# Patient Record
Sex: MALE | Race: WHITE | Employment: FULL TIME | URBAN - METROPOLITAN AREA
[De-identification: names, ages, dates, MRNs, and addresses within clinical notes are randomized per-mention and may not be internally consistent; named-entity substitution may affect disease eponyms.]

---

## 2020-01-19 ENCOUNTER — HOSPITAL ENCOUNTER (EMERGENCY)
Age: 50
Discharge: HOME | End: 2020-01-19
Attending: EMERGENCY MEDICINE
Payer: COMMERCIAL

## 2020-01-19 VITALS
BODY MASS INDEX: 32.78 KG/M2 | OXYGEN SATURATION: 100 % | SYSTOLIC BLOOD PRESSURE: 155 MMHG | HEIGHT: 63 IN | RESPIRATION RATE: 16 BRPM | WEIGHT: 185 LBS | DIASTOLIC BLOOD PRESSURE: 87 MMHG | HEART RATE: 105 BPM | TEMPERATURE: 99.5 F

## 2020-01-19 DIAGNOSIS — M79.18 BUTTOCK PAIN: Primary | ICD-10-CM

## 2020-01-19 PROCEDURE — 99284 EMERGENCY DEPT VISIT MOD MDM: CPT

## 2020-01-19 RX ORDER — TRAMADOL HYDROCHLORIDE 50 MG/1
50 TABLET ORAL
COMMUNITY
End: 2020-08-26

## 2020-01-19 RX ORDER — HYDROCHLOROTHIAZIDE 12.5 MG/1
12.5 TABLET ORAL DAILY
COMMUNITY
End: 2020-08-26

## 2020-01-19 RX ORDER — CEPHALEXIN 500 MG/1
500 CAPSULE ORAL 4 TIMES DAILY
Qty: 28 CAP | Refills: 0 | Status: SHIPPED | OUTPATIENT
Start: 2020-01-19 | End: 2020-01-26

## 2020-01-19 NOTE — ED NOTES
Patient states he is here today with c/o mid buttocks pain that he states started yesterday. He states he has a history of yunior-abscess' and that the last 2 times he had them he had to have surgery to have them removed and was hospitalized for one because his white count was greater than [id number]. The patient denies any other complaint at this time. Emergency Department Nursing Plan of Care       The Nursing Plan of Care is developed from the Nursing assessment and Emergency Department Attending provider initial evaluation. The plan of care may be reviewed in the ED Provider note.     The Plan of Care was developed with the following considerations:   Patient / Family readiness to learn indicated by:verbalized understanding  Persons(s) to be included in education: patient  Barriers to Learning/Limitations:No    [address], RN    1/19/2020   8:53 AM

## 2020-01-19 NOTE — ED PROVIDER NOTES
EMERGENCY DEPARTMENT HISTORY AND PHYSICAL EXAM      Date: 1/19/2020  Patient Name: Mita Hardin    History of Presenting Illness     Chief Complaint   Patient presents with    Buttocks pain     mid buttocks, pt is not sure if it is an abscess or not but has had them in the past in this area     History Provided By: Patient    HPI: Mtia Hardin, 52 y.o. male with past medical history significant for asthma, hypertension, neurofibromatosis, and pilonidal abscesses who presents via private vehicle to the ED with cc of upper buttocks pain that started yesterday. Patient states he has a history of pilonidal abscesses and this is the way the usually present. He denies any firmness or swelling to the area. His pain is worse with sitting and nothing makes the pain better. He denies any fevers, chills, vomiting, or diarrhea. PMHx: Asthma, hypertension, neurofibromatosis, pilonidal abscess  Social Hx: Denies alcohol, tobacco, or illegal drug use    PCP: None    There are no other complaints, changes, or physical findings at this time. No current facility-administered medications on file prior to encounter. Current Outpatient Medications on File Prior to Encounter   Medication Sig Dispense Refill    hydroCHLOROthiazide (HYDRODIURIL) 12.5 mg tablet Take 12.5 mg by mouth daily.  traMADol (ULTRAM) 50 mg tablet Take 50 mg by mouth every six (6) hours as needed for Pain. Past History     Past Medical History:  Past Medical History:   Diagnosis Date    Asthma     Hypertension     Ill-defined condition     neurofibromatosis     Past Surgical History:  Past Surgical History:   Procedure Laterality Date    HX HEENT      surgery for a broken nose     Family History:  History reviewed. No pertinent family history.   Social History:  Social History     Tobacco Use    Smoking status: Never Smoker    Smokeless tobacco: Never Used   Substance Use Topics    Alcohol use: Never     Frequency: Never    Drug use: Never     Allergies:  No Known Allergies  Review of Systems   Review of Systems   Constitutional: Negative for chills and fever. HENT: Negative for congestion, rhinorrhea, sneezing and sore throat. Eyes: Negative for redness and visual disturbance. Respiratory: Negative for shortness of breath. Cardiovascular: Negative for chest pain and leg swelling. Gastrointestinal: Negative for abdominal pain, anal bleeding, constipation, nausea and vomiting. Genitourinary: Negative for difficulty urinating and frequency. Musculoskeletal: Negative for back pain, myalgias and neck stiffness. Skin: Negative for rash. Pain between the buttocks   Neurological: Negative for dizziness, syncope, weakness and headaches. Hematological: Negative for adenopathy. All other systems reviewed and are negative. Physical Exam   Physical Exam  Vitals signs and nursing note reviewed. Constitutional:       Appearance: Normal appearance. He is well-developed. HENT:      Head: Normocephalic and atraumatic. Neck:      Musculoskeletal: Full passive range of motion without pain, normal range of motion and neck supple. Cardiovascular:      Rate and Rhythm: Regular rhythm. Tachycardia present. Pulses: Normal pulses. Heart sounds: Normal heart sounds. No murmur. Pulmonary:      Effort: Pulmonary effort is normal. No respiratory distress. Breath sounds: Normal breath sounds. Chest:      Chest wall: No tenderness. Abdominal:      General: Bowel sounds are normal.      Palpations: Abdomen is soft. Tenderness: There is no tenderness. There is no guarding or rebound. Musculoskeletal:        Back:    Skin:     General: Skin is warm and dry. Findings: No erythema or rash. Neurological:      Mental Status: He is alert and oriented to person, place, and time. Psychiatric:         Speech: Speech normal.         Behavior: Behavior normal.         Thought Content:  Thought content normal. Judgment: Judgment normal.       Diagnostic Study Results   Labs -   No results found for this or any previous visit (from the past 12 hour(s)). Radiologic Studies -   No orders to display     No results found. Medical Decision Making   I am the first provider for this patient. I reviewed the vital signs, available nursing notes, past medical history, past surgical history, family history and social history. Vital Signs-Reviewed the patient's vital signs. Patient Vitals for the past 12 hrs:   Temp Pulse Resp BP SpO2   01/19/20 0811 99.5 °F (37.5 °C) (!) 112 16 (!) 158/94 100 %     Pulse Oximetry Analysis - 100% on RA    Records Reviewed: Nursing Notes    Provider Notes (Medical Decision Making):   49-year-old male presents with pain in the pilonidal region. I do not appreciate any abscess, fluid collection, or induration on physical exam.  Discussed with patient that there is no indication for I&D at this time. Per current literature review, there may be some recommendations to start on antibiotics if this is an early infection. Will start patient on Keflex and have patient follow-up with outpatient colorectal surgery and/or general surgery. If symptoms worsen or a fluid collection does appear, will have patient return to the emergency department for reevaluation. ED Course:   Initial assessment performed. The patients presenting problems have been discussed, and they are in agreement with the care plan formulated and outlined with them. I have encouraged them to ask questions as they arise throughout their visit. Progress Note:   Updated pt on all returned results and findings. Discussed the importance of proper follow up as referred below along with return precautions. Pt in agreement with the care plan and expresses agreement with and understanding of all items discussed. Disposition:  Discharge Note:  The pt is ready for discharge.  The pt's signs, symptoms, diagnosis, and discharge instructions have been discussed and pt has conveyed their understanding. The pt is to follow up as recommended or return to ER should their symptoms worsen. Plan has been discussed and pt is in agreement. PLAN:  1. Current Discharge Medication List      START taking these medications    Details   cephALEXin (KEFLEX) 500 mg capsule Take 1 Cap by mouth four (4) times daily for 7 days. Qty: 28 Cap, Refills: 0           2. Follow-up Information     Follow up With Specialties Details Why Contact Info    [facility]  Schedule an appointment as soon as possible for a visit  [address] [facility]    Alfa Hunter MD General Surgery, Breast Surgery, Oncology Schedule an appointment as soon as possible for a visit  [address]  [address] [facility],[address]      [facility] [address] [place] [address] DEPT Emergency Medicine  As needed, If symptoms worsen [facility]  [phone]        Return to ED if worse     Diagnosis     Clinical Impression:   1. Buttock pain            Please note that this dictation was completed with Dragon, computer voice recognition software. Quite often unanticipated grammatical, syntax, homophones, and other interpretive errors are inadvertently transcribed by the computer software. Please disregard these errors. Additionally, please excuse any errors that have escaped final proofreading.

## 2020-01-19 NOTE — DISCHARGE INSTRUCTIONS
Patient Education        Learning About Pilonidal Disease  What is pilonidal disease? Pilonidal (say \"hs-bbl-RK-dul\") disease is a long-term skin infection. The infection develops in a cyst at the top of or next to the crease between the buttocks. The cyst may look like a small dimple, which is called a \"pit\" or \"sinus. \" Hair may grow from the pit, and you may have several pits. What are the symptoms? · You may have no symptoms. · If the cyst is infected, you may:  ? Have redness or swelling in the area. ? Have cloudy fluid or blood draining from the cyst.  ? Find it hard to walk or sit because of pain. ? Have a fever. This is not common. How can you prevent infection? You may be able to prevent infection and symptoms. · Keep the area clean and dry. · Avoid sitting on hard surfaces for long periods of time. How is pilonidal disease treated? If you have a pilonidal cyst that is not causing symptoms, you don't need medical treatment. If a pilonidal cyst is infected:  · You will probably get antibiotics. If your doctor prescribes antibiotics, take them as directed. Do not stop taking them just because you feel better. You need to take the full course of antibiotics. · Soak in a warm tub several times a day. · Ask your doctor if you can take an over-the-counter pain medicine, such as acetaminophen (Tylenol), ibuprofen (Advil, Motrin), or naproxen (Aleve). Read and follow all instructions on the label. · You may need to have the cyst cut open and drained or removed. Follow-up care is a key part of your treatment and safety. Be sure to make and go to all appointments, and call your doctor if you are having problems. It's also a good idea to know your test results and keep a list of the medicines you take. Where can you learn more? Go to http://gadiel-mick.info/. Enter C612 in the search box to learn more about \"Learning About Pilonidal Disease. \"  Current as of: April 1, 2019  Content Version: 12.2  © 0848-7425 [employer]. Care instructions adapted under license by [employer] (which disclaims liability or warranty for this information). If you have questions about a medical condition or this instruction, always ask your healthcare professional. [address] any warranty or liability for your use of this information.

## 2020-02-07 ENCOUNTER — OFFICE VISIT (OUTPATIENT)
Dept: SURGERY | Age: 50
End: 2020-02-07

## 2020-02-07 ENCOUNTER — TELEPHONE (OUTPATIENT)
Dept: SURGERY | Age: 50
End: 2020-02-07

## 2020-02-07 VITALS
DIASTOLIC BLOOD PRESSURE: 82 MMHG | SYSTOLIC BLOOD PRESSURE: 149 MMHG | HEART RATE: 122 BPM | BODY MASS INDEX: 31.71 KG/M2 | RESPIRATION RATE: 20 BRPM | TEMPERATURE: 99.8 F | OXYGEN SATURATION: 99 % | HEIGHT: 63 IN | WEIGHT: 179 LBS

## 2020-02-07 DIAGNOSIS — L02.91 ABSCESS: Primary | ICD-10-CM

## 2020-02-07 RX ORDER — AMLODIPINE BESYLATE 10 MG/1
TABLET ORAL
COMMUNITY
Start: 2020-01-30 | End: 2020-08-26

## 2020-02-07 RX ORDER — LIDOCAINE HYDROCHLORIDE AND EPINEPHRINE 10; 10 MG/ML; UG/ML
10 INJECTION, SOLUTION INFILTRATION; PERINEURAL ONCE
Qty: 1 VIAL | Refills: 0
Start: 2020-02-07 | End: 2020-02-07

## 2020-02-07 NOTE — PROGRESS NOTES
HISTORY OF PRESENT ILLNESS  Tu Conrad is a 52 y.o. male. Had a pilonidal cyst before  2 surgeries 8 years ago    Pain coming back    3 weeks ago took a round of keflex        ____________________________________________________________________________  Patient presents with:  Cyst: Seen at the request of Dr Dank Mccollum ([facility]) to evaluate possible pilonidal cyst    /82 (BP 1 Location: Right arm, BP Patient Position: Sitting)   Pulse (!) 122   Temp 99.8 °F (37.7 °C)   Resp 20   Ht 5' 3\" (1.6 m)   Wt 81.2 kg (179 lb)   SpO2 99%   BMI 31.71 kg/m²   Past Medical History:  No date: Asthma  No date: Hypertension  No date: Ill-defined condition      Comment:  neurofibromatosis  Past Surgical History:  No date: HX HEENT      Comment:  surgery for a broken nose  Social History    Socioeconomic History      Marital status: UNKNOWN      Spouse name: Not on file      Number of children: Not on file      Years of education: Not on file      Highest education level: Not on file    Tobacco Use      Smoking status: Never Smoker      Smokeless tobacco: Never Used    Substance and Sexual Activity      Alcohol use: Never        Frequency: Never      Drug use: Never    History reviewed. No pertinent family history. Current Outpatient Medications:  amLODIPine (NORVASC) 10 mg tablet, TAKE 1 TABLET BY MOUTH EVERY DAY  hydroCHLOROthiazide (HYDRODIURIL) 12.5 mg tablet, Take 12.5 mg by mouth daily. traMADol (ULTRAM) 50 mg tablet, Take 50 mg by mouth every six (6) hours as needed for Pain. No current facility-administered medications for this visit. Allergies: No Known Allergies  _____________________________________________________________________________      Cyst   The history is provided by the patient. This is a new problem. The current episode started more than 1 week ago. The problem occurs constantly. The problem has not changed since onset. Pertinent negatives include no chest pain, no abdominal pain, no headaches and no shortness of breath. The symptoms are aggravated by bending and twisting. Nothing relieves the symptoms. The treatment provided no relief. Review of Systems   Constitutional: Negative for chills, fever and weight loss. HENT: Negative for ear pain. Eyes: Negative for pain. Respiratory: Negative for shortness of breath. Cardiovascular: Negative for chest pain. Gastrointestinal: Negative for abdominal pain and blood in stool. Genitourinary: Negative for hematuria. Musculoskeletal: Negative for joint pain. Skin: Negative for rash. Neurological: Negative for dizziness, focal weakness, seizures and headaches. Endo/Heme/Allergies: Does not bruise/bleed easily. Psychiatric/Behavioral: The patient does not have insomnia. Physical Exam  Constitutional:       General: He is not in acute distress. Appearance: He is well-developed. He is not diaphoretic. HENT:      Head: Normocephalic and atraumatic. Mouth/Throat:      Pharynx: No oropharyngeal exudate. Eyes:      Pupils: Pupils are equal, round, and reactive to light. Neck:      Musculoskeletal: Normal range of motion. Trachea: No tracheal deviation. Cardiovascular:      Rate and Rhythm: Normal rate and regular rhythm. Heart sounds: Normal heart sounds. No murmur. Pulmonary:      Effort: Pulmonary effort is normal. No respiratory distress. Breath sounds: Normal breath sounds. No wheezing. Abdominal:      General: Bowel sounds are normal. There is no distension. Palpations: Abdomen is soft. There is no mass. Tenderness: There is no abdominal tenderness. There is no guarding or rebound. Musculoskeletal: Normal range of motion. General: No tenderness. Lymphadenopathy:      Cervical: No cervical adenopathy. Skin:     General: Skin is warm. Findings: No erythema or rash.    Neurological:      Mental Status: He is alert and oriented to person, place, and time.   Psychiatric:         Behavior: Behavior normal.         ASSESSMENT and PLAN    ICD-10-CM ICD-9-CM    1. Abscess L02.91 682.9 AEROBIC BACTERIAL CULTURE      lidocaine-EPINEPHrine (XYLOCAINE) 1 %-1:100,000 injection     I had an extensive discussion with Zoraida Valles regarding the risks, benefits, and alternatives of proceeding with a incision and drainage of this abscess. Risks of surgery including the risk of anesthesia, bleeding, infection, injury to underlying structures, recurrence, need for repeat or more extensive procedures, and the lack of symptomatic improvement were discussed and he is in agreement to proceed. Rx management:  Orders Placed This Encounter    AEROBIC BACTERIAL CULTURE     Aerobic culture of I&D pilonidal cyst    amLODIPine (NORVASC) 10 mg tablet     Sig: TAKE 1 TABLET BY MOUTH EVERY DAY    lidocaine-EPINEPHrine (XYLOCAINE) 1 %-1:100,000 injection     Sig: 10 mL by IntraDERMal route once for 1 dose. Indications: administration of local anesthetic drug, Lot #-[id number]  Exp:  [id number]  20mL btl - only 10 mL used     Dispense:  1 Vial     Refill:  0    trimethoprim-sulfamethoxazole (BACTRIM DS, SEPTRA DS) 160-800 mg per tablet     Sig: Take 1 Tab by mouth two (2) times a day for 10 days. Dispense:  20 Tab     Refill:  0       He will complete his course of antibiotics. Wound care instructions were reviewed and provided in writing. Thank you for this consult. Procedure: Incision and drainage of complex coccygeal abscess       Procedure Details: The risks, benefits, and alternatives were explained and consent was obtained for the procedure. The area was sterile prepped and draped in the usual manner. 1% lidocaine with epinephrine was infiltrated into the skin overlying the abscess. An incision was made. A Very large amount of pus was obtained. A culture was obtained. The loculations and crypts within the wound were broken up with a hemostat.   The wound was packed with 1/2\" iodoform gauze. A sterile dressing was then applied. The patient tolerated the procedure well. Wound care instructions were given.

## 2020-02-07 NOTE — PATIENT INSTRUCTIONS
**Wound Care:    · Replace outer gauze with new dry gauze twice a day starting today. · Starting Tomorrow, start pulling out the packing 2\" a day:  · Pull the packing 2\" and trim the excess, leave a long tail so you don't loose the packing in the wound.   · Continue to change the outer gauze twice a day  · Pull the packing daily until the packing is completely out

## 2020-02-07 NOTE — PROGRESS NOTES
[facility]  OFFICE PROCEDURE PROGRESS NOTE        Chart reviewed for the following:   Jesus LUGO, have reviewed the History, Physical and updated the Allergic reactions for [address] performed immediately prior to start of procedure:   Jesus LUGO, have performed the following reviews on Wayne Younger prior to the start of the procedure:            * Patient was identified by name and date of birth   * Agreement on procedure being performed was verified  * Risks and Benefits explained to the patient  * Procedure site verified and marked as necessary  * Patient was positioned for comfort  * Consent was signed and verified     Time: 2:25 pm      Date of procedure: 2/7/2020    Procedure performed by:  Amparo Rainey MD    Provider assist: none    Patient assisted by: self    How tolerated by patient: tolerated the procedure well with no complications    Post Procedural Pain Scale: 0 - No Hurt    Comments: none

## 2020-02-07 NOTE — TELEPHONE ENCOUNTER
Spoke with home health nurse. Mr Yoanna Ngo needs to have a confirmed caregiver in order for nurse to have someone that can change dressing on a daily basis. Pt will call our office if he needs to be seen sooner than next appt.

## 2020-02-08 RX ORDER — SULFAMETHOXAZOLE AND TRIMETHOPRIM 800; 160 MG/1; MG/1
1 TABLET ORAL 2 TIMES DAILY
Qty: 20 TAB | Refills: 0 | Status: SHIPPED | OUTPATIENT
Start: 2020-02-08 | End: 2020-03-05 | Stop reason: SDUPTHER

## 2020-02-10 LAB
BACTERIA SPEC AEROBE CULT: ABNORMAL
BACTERIA SPEC AEROBE CULT: ABNORMAL

## 2020-02-20 ENCOUNTER — OFFICE VISIT (OUTPATIENT)
Dept: SURGERY | Age: 50
End: 2020-02-20

## 2020-02-20 VITALS
SYSTOLIC BLOOD PRESSURE: 150 MMHG | WEIGHT: 185 LBS | BODY MASS INDEX: 32.78 KG/M2 | TEMPERATURE: 98.3 F | HEART RATE: 74 BPM | HEIGHT: 63 IN | OXYGEN SATURATION: 99 % | DIASTOLIC BLOOD PRESSURE: 81 MMHG

## 2020-02-20 DIAGNOSIS — L02.91 ABSCESS: Primary | ICD-10-CM

## 2020-02-20 NOTE — PROGRESS NOTES
Chief Complaint   Patient presents with    Follow-up     po I&D OF COMPLEX COCCYGEAL ABSCESS 02/07/2020       Took about 10 days to get the packing out. He is feeling much better denies any pain. Denies any recent drainage. On exam there is still a 1 cm opening definitely looks much better no more fluctuance. Today I do not see any sinus tracts so there is a good chance that this is not a recurrent pilonidal cyst.    We reviewed wound care have asked to keep it covered with some dry gauze until it is completely healed he will follow-up in about in about 1 month. I can reassess it then if it looks completely healed and not sure he is getting much more. He expressed understanding of our discussion and agreeable with the plan. He will follow-up sooner with any recurrent symptoms. I had an extensive and thorough discussion with Wayne Younger regarding current diagnosis and treatment recommendations. Total face-to-face time spent with him was 15 minutes with the majority spent with counseling and coordination of care.

## 2020-02-20 NOTE — PROGRESS NOTES
Chief Complaint   Patient presents with    Follow-up     po I&D OF COMPLEX COCCYGEAL ABSCESS 02/07/2020     1. Have you been to the ER, urgent care clinic since your last visit? Hospitalized since your last visit? No    2. Have you seen or consulted any other health care providers outside of the [address] [place] [address] since your last visit? Include any pap smears or colon screening.  No

## 2020-03-05 ENCOUNTER — OFFICE VISIT (OUTPATIENT)
Dept: SURGERY | Age: 50
End: 2020-03-05

## 2020-03-05 VITALS
BODY MASS INDEX: 32.92 KG/M2 | WEIGHT: 185.8 LBS | RESPIRATION RATE: 18 BRPM | HEIGHT: 63 IN | HEART RATE: 100 BPM | TEMPERATURE: 100 F | OXYGEN SATURATION: 100 % | SYSTOLIC BLOOD PRESSURE: 157 MMHG | DIASTOLIC BLOOD PRESSURE: 83 MMHG

## 2020-03-05 DIAGNOSIS — K61.1 PERIRECTAL ABSCESS: Primary | ICD-10-CM

## 2020-03-05 RX ORDER — SULFAMETHOXAZOLE AND TRIMETHOPRIM 800; 160 MG/1; MG/1
1 TABLET ORAL 2 TIMES DAILY
Qty: 14 TAB | Refills: 0 | Status: SHIPPED | OUTPATIENT
Start: 2020-03-05 | End: 2020-03-12

## 2020-03-05 NOTE — PROGRESS NOTES
Chief Complaint   Patient presents with    Post-Op Problem     S/P I&D of coccygeal abscess on 2/20/20. Surgical sight bothering pt. 1. Have you been to the ER, urgent care clinic since your last visit? Hospitalized since your last visit? No    2. Have you seen or consulted any other health care providers outside of the [address] [place] [address] since your last visit? Include any pap smears or colon screening.   [facility] 2/2020

## 2020-03-05 NOTE — PROGRESS NOTES
Chief Complaint   Patient presents with    Post-Op Problem     S/P I&D of coccygeal abscess on 2/20/20. Surgical sight bothering pt. Pain got better after the I&D    Pain came over the weekend    Exam: He has a recurrent 3 mm opening on the right that seems to track down towards the perirectal area. There are no midline sinus tracts making a pilonidal cyst less likely. The drainage was sent for culture. I reviewed a differential diagnosis with Mr. Hermilo Stewart he now is had a second recurrence of an abscess in this area. He is previously had a pilonidal cyst excised however I currently do not see any sinus tracts that would make me believe that this is a recurrent pilonidal cyst.  He may have a chronic abscess cavity and may be a perirectal fistula. I suggested we do an MRI to better evaluate the area and then after that he will likely need exam under anesthesia and excision of this chronic abscess possible fistulotomy. I had an extensive discussion with Mignon Martin regarding the risks, benefits, and alternatives of proceeding with surgery. Risks of surgery including the risk of anesthesia, bleeding, infection, injury to underlying structures, recurrence, incontinence, need for repeat or more extensive procedures, and the lack of symptomatic improvement were discussed and he is in agreement to proceed. Rx management:  Orders Placed This Encounter    MRI PELV W CONT     Standing Status:   Future     Standing Expiration Date:   9/5/2021     Order Specific Question:   STAT Creatinine as indicated     Answer:   Yes     Order Specific Question:   Reason for Exam     Answer:   recurrent perirectal abscess, evaluate for fistula    trimethoprim-sulfamethoxazole (BACTRIM DS, SEPTRA DS) 160-800 mg per tablet     Sig: Take 1 Tab by mouth two (2) times a day for 7 days. Dispense:  14 Tab     Refill:  0       Further management after the MRI.     Expressed understanding of our discussion and agreeable with the plan. I had an extensive and thorough discussion with Orestes Soliman regarding current diagnosis and treatment recommendations. Total face-to-face time spent with him was 25 minutes with the majority spent with counseling and coordination of care.

## 2020-03-09 LAB
BACTERIA SPEC AEROBE CULT: ABNORMAL
BACTERIA SPEC AEROBE CULT: ABNORMAL

## 2020-03-19 ENCOUNTER — HOSPITAL ENCOUNTER (OUTPATIENT)
Dept: MRI IMAGING | Age: 50
Discharge: HOME | End: 2020-03-19
Attending: SURGERY
Payer: COMMERCIAL

## 2020-03-19 DIAGNOSIS — K61.1 PERIRECTAL ABSCESS: ICD-10-CM

## 2020-03-19 PROCEDURE — 72197 MRI PELVIS W/O & W/DYE: CPT

## 2020-03-19 PROCEDURE — A9575 INJ GADOTERATE MEGLUMI 0.1ML: HCPCS | Performed by: SURGERY

## 2020-03-19 PROCEDURE — 74011250636 HC RX REV CODE- 250/636: Performed by: SURGERY

## 2020-03-19 RX ORDER — GADOTERATE MEGLUMINE 376.9 MG/ML
15 INJECTION INTRAVENOUS
Status: COMPLETED | OUTPATIENT
Start: 2020-03-19 | End: 2020-03-19

## 2020-03-19 RX ADMIN — GADOTERATE MEGLUMINE 15 ML: 376.9 INJECTION INTRAVENOUS at 16:19

## 2020-03-24 ENCOUNTER — TELEPHONE (OUTPATIENT)
Dept: SURGERY | Age: 50
End: 2020-03-24

## 2020-03-24 DIAGNOSIS — R93.5 ABNORMAL MRI, PELVIS: Primary | ICD-10-CM

## 2020-03-24 NOTE — TELEPHONE ENCOUNTER
Pt agreeable with appointment with Dr. Prerna Campbell on 4/1/20 @ 10:00am.Tel. # and address given to pt. Notes, imaging,etc faxed to Dr. Kerns Fu office.

## 2020-03-24 NOTE — TELEPHONE ENCOUNTER
Reviewed MRI findings and discussed with Dr. Isaias Burch:  1. Fluid tract penetrating the left distal rectal wall at 1:00-2:00. Associated  crescent of extraluminal or subserosal fluid extending along the left lateral  rectal wall. Anteroinferior extension into the left puborectalis muscle. 2. Second fluid tract penetrating the right mid rectal wall at 10:00. Associated  crescent of extraluminal or subserosal fluid extending along the right lateral  rectal wall. The two crescents of fluid probably communicate posteriorly at  6:00.  3. Eroded coccyx. Trace fluid collection anteroinferior to the residual coccyx. Thick rind of enhancement extending around the coccyx, replacing the levator  plate, and extending into the puborectalis and iliococcygeal muscles  posteriorly. This may communicate with the rectal wall fluid. 4. Multiple, small, indeterminate, enhancing nodules along the mesorectal  fascia. No overt rectal mass. Nearly circumferential, low level T2  hyperintensity without bulky, masslike component. Reactive wall change is  favored over infiltrate of rectal tumor. 5. Bilateral plexiform neurofibromas L4-S4. Subcentimeter right quadriceps and  gluteal fat masses are probably also neurofibromas. 6. Larger mass centered on the right sciatic nerve is indeterminate, but may  also be a nerve sheath tumor. If prior cross-sectional imaging of the pelvis is  available, a comparison addendum can be made to this report when the images are  obtained and uploaded in PACS. 7. Solitary, enlarged, right pelvic sidewall lymph node.     Silvia Hooks is feeling much better. Symptoms nearly resolved. Never had a colonoscopy. We discussed cx results with +ressisyessy Poole      Please make him an appointment with Dr. Jeanie Del Castillo. I have discussed with him.

## 2020-03-31 ENCOUNTER — TELEPHONE (OUTPATIENT)
Dept: SURGERY | Age: 50
End: 2020-03-31

## 2020-03-31 NOTE — TELEPHONE ENCOUNTER
Patient had a colonoscopy scheduled with Dr. Anahy Parra but that has been cancelled and patient wants to know what to do since that was cancelled.

## 2020-03-31 NOTE — TELEPHONE ENCOUNTER
Spoke with pt. Colonoscopy cx'd with Dr. Uche Cruz due to COVID-19. They will call him back in may to reschedule.

## 2020-08-26 ENCOUNTER — OFFICE VISIT (OUTPATIENT)
Dept: SURGERY | Age: 50
End: 2020-08-26
Payer: COMMERCIAL

## 2020-08-26 VITALS
BODY MASS INDEX: 30.99 KG/M2 | TEMPERATURE: 97.4 F | HEART RATE: 85 BPM | OXYGEN SATURATION: 100 % | RESPIRATION RATE: 18 BRPM | SYSTOLIC BLOOD PRESSURE: 164 MMHG | DIASTOLIC BLOOD PRESSURE: 85 MMHG | WEIGHT: 174.9 LBS | HEIGHT: 63 IN

## 2020-08-26 DIAGNOSIS — K61.1 PERIRECTAL ABSCESS: Primary | ICD-10-CM

## 2020-08-26 PROCEDURE — 99213 OFFICE O/P EST LOW 20 MIN: CPT | Performed by: SURGERY

## 2020-08-26 RX ORDER — SULFAMETHOXAZOLE AND TRIMETHOPRIM 800; 160 MG/1; MG/1
1 TABLET ORAL 2 TIMES DAILY
Qty: 20 TAB | Refills: 0 | Status: SHIPPED | OUTPATIENT
Start: 2020-08-26 | End: 2020-09-05

## 2020-08-26 NOTE — PROGRESS NOTES
I did an I&D of a coccygeal abscess in February. Cx +ESBL sensitive to Bactrim. There were no sinus tracts and it was not consistent with a pilonidal cyst.  It initially healed completely but then he came back and saw me in March with recurrent pain and recurrent small 3 cm opening that tracked down to the perirectal area. We did an MRI with the following findings:    IMPRESSION:  1. Fluid tract penetrating the left distal rectal wall at 1:00-2:00. Associated  crescent of extraluminal or subserosal fluid extending along the left lateral  rectal wall. Anteroinferior extension into the left puborectalis muscle. 2. Second fluid tract penetrating the right mid rectal wall at 10:00. Associated  crescent of extraluminal or subserosal fluid extending along the right lateral  rectal wall. The two crescents of fluid probably communicate posteriorly at  6:00.  3. Eroded coccyx. Trace fluid collection anteroinferior to the residual coccyx. Thick rind of enhancement extending around the coccyx, replacing the levator  plate, and extending into the puborectalis and iliococcygeal muscles  posteriorly. This may communicate with the rectal wall fluid. 4. Multiple, small, indeterminate, enhancing nodules along the mesorectal  fascia. No overt rectal mass. Nearly circumferential, low level T2  hyperintensity without bulky, masslike component. Reactive wall change is  favored over infiltrate of rectal tumor. 5. Bilateral plexiform neurofibromas L4-S4. Subcentimeter right quadriceps and  gluteal fat masses are probably also neurofibromas. 6. Larger mass centered on the right sciatic nerve is indeterminate, but may  also be a nerve sheath tumor. If prior cross-sectional imaging of the pelvis is  available, a comparison addendum can be made to this report when the images are  obtained and uploaded in PACS.   7. Solitary, enlarged, right pelvic sidewall lymph node.           I set him up to be evaluated by Dr. Kvng Jalloh, but his appointment got canceled due to COVID-19 and he never rescheduled. He is back today with recurrent light drainage. There is no erythema no fluctuance. No pain with BMs    I will start him on Bactrim today. We will assist him with rescheduling with Dr. Mervin Chan to evaluate this complex perirectal abscess. Expressed understanding of our discussion and agreeable with the plan      I had an extensive and thorough discussion with Hammondvinnie Jasso regarding current diagnosis and treatment recommendations.   Total face-to-face time spent with him was 15 minutes with the majority spent with counseling and coordination of care.

## 2020-08-26 NOTE — PROGRESS NOTES
Patient scheduled to see Dr Fe Swan Friday 8/28/2020 at 10 am per Dr Tonio Chicas request due to perirectal abscess.

## 2020-08-26 NOTE — PROGRESS NOTES
Chief Complaint   Patient presents with    Cyst     new cyst on buttock last seen 3/5/2020 , colonoscopy cancelled pt has not had     1. Have you been to the ER, urgent care clinic since your last visit? Hospitalized since your last visit? No    2. Have you seen or consulted any other health care providers outside of the [address] [place] [address] since your last visit? Include any pap smears or colon screening.  Yes PCP([facility])

## 2020-10-29 ENCOUNTER — HOSPITAL ENCOUNTER (OUTPATIENT)
Dept: PREADMISSION TESTING | Age: 50
Discharge: HOME | End: 2020-10-29
Attending: SURGERY
Payer: COMMERCIAL

## 2020-10-29 VITALS
HEIGHT: 63 IN | RESPIRATION RATE: 16 BRPM | DIASTOLIC BLOOD PRESSURE: 75 MMHG | SYSTOLIC BLOOD PRESSURE: 150 MMHG | HEART RATE: 68 BPM | OXYGEN SATURATION: 100 % | WEIGHT: 179.68 LBS | BODY MASS INDEX: 31.84 KG/M2 | TEMPERATURE: 98.2 F

## 2020-10-29 LAB
ANION GAP SERPL CALC-SCNC: 4 MMOL/L (ref 5–15)
ATRIAL RATE: 67 BPM
BUN SERPL-MCNC: 11 MG/DL (ref 6–20)
BUN/CREAT SERPL: 13 (ref 12–20)
CALCIUM SERPL-MCNC: 8.4 MG/DL (ref 8.5–10.1)
CALCULATED P AXIS, ECG09: 53 DEGREES
CALCULATED R AXIS, ECG10: 55 DEGREES
CALCULATED T AXIS, ECG11: 50 DEGREES
CHLORIDE SERPL-SCNC: 108 MMOL/L (ref 97–108)
CO2 SERPL-SCNC: 28 MMOL/L (ref 21–32)
CREAT SERPL-MCNC: 0.88 MG/DL (ref 0.7–1.3)
DIAGNOSIS, 93000: NORMAL
ERYTHROCYTE [DISTWIDTH] IN BLOOD BY AUTOMATED COUNT: 18.3 % (ref 11.5–14.5)
GLUCOSE SERPL-MCNC: 84 MG/DL (ref 65–100)
HCT VFR BLD AUTO: 43.6 % (ref 36.6–50.3)
HGB BLD-MCNC: 13.1 G/DL (ref 12.1–17)
MCH RBC QN AUTO: 22.8 PG (ref 26–34)
MCHC RBC AUTO-ENTMCNC: 30 G/DL (ref 30–36.5)
MCV RBC AUTO: 76 FL (ref 80–99)
NRBC # BLD: 0 K/UL (ref 0–0.01)
NRBC BLD-RTO: 0 PER 100 WBC
P-R INTERVAL, ECG05: 162 MS
PLATELET # BLD AUTO: 246 K/UL (ref 150–400)
POTASSIUM SERPL-SCNC: 4.1 MMOL/L (ref 3.5–5.1)
Q-T INTERVAL, ECG07: 430 MS
QRS DURATION, ECG06: 86 MS
QTC CALCULATION (BEZET), ECG08: 454 MS
RBC # BLD AUTO: 5.74 M/UL (ref 4.1–5.7)
SODIUM SERPL-SCNC: 140 MMOL/L (ref 136–145)
VENTRICULAR RATE, ECG03: 67 BPM
WBC # BLD AUTO: 14.2 K/UL (ref 4.1–11.1)

## 2020-10-29 PROCEDURE — 80048 BASIC METABOLIC PNL TOTAL CA: CPT

## 2020-10-29 PROCEDURE — 85027 COMPLETE CBC AUTOMATED: CPT

## 2020-10-29 PROCEDURE — 36415 COLL VENOUS BLD VENIPUNCTURE: CPT

## 2020-10-29 PROCEDURE — 93005 ELECTROCARDIOGRAM TRACING: CPT

## 2020-10-29 RX ORDER — SODIUM CHLORIDE, SODIUM LACTATE, POTASSIUM CHLORIDE, CALCIUM CHLORIDE 600; 310; 30; 20 MG/100ML; MG/100ML; MG/100ML; MG/100ML
25 INJECTION, SOLUTION INTRAVENOUS ONCE
Status: CANCELLED | OUTPATIENT
Start: 2020-11-05 | End: 2020-11-05

## 2020-10-29 RX ORDER — SPIRONOLACTONE 25 MG/1
25 TABLET ORAL DAILY
COMMUNITY

## 2020-10-29 NOTE — PERIOP NOTES
[facility]  Preoperative Instructions        Surgery Date 11/5/2020          Time of Arrival 1000am  Contact#  [phone]    1. On the day of your surgery, please report to the Surgical Services Registration Desk and sign in at your designated time. The Surgery Center is located to the right of the Emergency Room. 2. You must have someone with you to drive you home. You should not drive a car for 24 hours following surgery. Please make arrangements for a friend or family member to stay with you for the first 24 hours after your surgery. 3. Do not have anything to eat or drink (including water, gum, mints, coffee, juice) after midnight  11/4/2020  . ? This may not apply to medications prescribed by your physician. ?(Please note below the special instructions with medications to take the morning of your procedure.)    4. We recommend you do not drink any alcoholic beverages for 24 hours before and after your surgery. 5. Contact your surgeons office for instructions on the following medications: non-steroidal anti-inflammatory drugs (i.e. Advil, Aleve), vitamins, and supplements. (Some surgeons will want you to stop these medications prior to surgery and others may allow you to take them)  **If you are currently taking Plavix, Coumadin, Aspirin and/or other blood-thinning agents, contact your surgeon for instructions. ** Your surgeon will partner with the physician prescribing these medications to determine if it is safe to stop or if you need to continue taking. Please do not stop taking these medications without instructions from your surgeon    6. Wear comfortable clothes. Wear glasses instead of contacts. Do not bring any money or jewelry. Please bring picture ID, insurance card, and any prearranged co-payment or hospital payment. Do not wear make-up, particularly mascara the morning of your surgery.   Do not wear nail polish, particularly if you are having foot /hand surgery. Wear your hair loose or down, no ponytails, buns, howie pins or clips. All body piercings must be removed. Please shower with antibacterial soap for three consecutive days before and on the morning of surgery, but do not apply any lotions, powders or deodorants after the shower on the day of surgery. Please use a fresh towels after each shower. Please sleep in clean clothes and change bed linens the night before surgery. Please do not shave for 48 hours prior to surgery. Shaving of the face is acceptable. 7. You should understand that if you do not follow these instructions your surgery may be cancelled. If your physical condition changes (I.e. fever, cold or flu) please contact your surgeon as soon as possible. 8. It is important that you be on time. If a situation occurs where you may be late, please call [phone] (OR Holding Area). 9. If you have any questions and or problems, please call [phone] (Pre-admission Testing). 10. Your surgery time may be subject to change. You will receive a phone call the evening prior if your time changes. 11.  If having outpatient surgery, you must have someone to drive you here, stay with you during the duration of your stay, and to drive you home at time of discharge. Special Instructions: Follow all instructions your doctor has given you. Covid Testing 11/1/2020 arrive between [address] side drive up in front of [facility] office overhang. We recommend you self quarantine from time of testing til day of surgery. TAKE ALL MEDICATIONS DAY OF SURGERY EXCEPT:  None       I understand a pre-operative phone call will be made to verify my surgery time. In the event that I am not available, I give permission for a message to be left on my answering service and/or with another person?   yes         ___________________      __________   _________    (Signature of Patient)             (Witness)                (Date and Time)

## 2020-11-01 ENCOUNTER — HOSPITAL ENCOUNTER (OUTPATIENT)
Dept: PREADMISSION TESTING | Age: 50
Discharge: HOME | End: 2020-11-01
Payer: COMMERCIAL

## 2020-11-01 PROCEDURE — 87635 SARS-COV-2 COVID-19 AMP PRB: CPT

## 2020-11-02 LAB
HEALTH STATUS, XMCV2T: NORMAL
SARS-COV-2, COV2NT: NOT DETECTED
SOURCE, COVRS: NORMAL
SPECIMEN SOURCE, FCOV2M: NORMAL
SPECIMEN TYPE, XMCV1T: NORMAL

## 2020-11-05 ENCOUNTER — ANESTHESIA (OUTPATIENT)
Dept: SURGERY | Age: 50
End: 2020-11-05
Payer: COMMERCIAL

## 2020-11-05 ENCOUNTER — HOSPITAL ENCOUNTER (OUTPATIENT)
Age: 50
Setting detail: OUTPATIENT SURGERY
Discharge: HOME | End: 2020-11-05
Attending: SURGERY | Admitting: SURGERY
Payer: COMMERCIAL

## 2020-11-05 ENCOUNTER — ANESTHESIA EVENT (OUTPATIENT)
Dept: SURGERY | Age: 50
End: 2020-11-05
Payer: COMMERCIAL

## 2020-11-05 VITALS
SYSTOLIC BLOOD PRESSURE: 157 MMHG | HEIGHT: 63 IN | OXYGEN SATURATION: 100 % | DIASTOLIC BLOOD PRESSURE: 82 MMHG | WEIGHT: 174.82 LBS | RESPIRATION RATE: 18 BRPM | BODY MASS INDEX: 30.98 KG/M2 | TEMPERATURE: 98.1 F | HEART RATE: 73 BPM

## 2020-11-05 PROCEDURE — 77030014007 HC SPNG HEMSTAT J&J -B: Performed by: SURGERY

## 2020-11-05 PROCEDURE — 76010000138 HC OR TIME 0.5 TO 1 HR: Performed by: SURGERY

## 2020-11-05 PROCEDURE — 2709999900 HC NON-CHARGEABLE SUPPLY

## 2020-11-05 PROCEDURE — 74011250636 HC RX REV CODE- 250/636: Performed by: NURSE ANESTHETIST, CERTIFIED REGISTERED

## 2020-11-05 PROCEDURE — 77030008684 HC TU ET CUF COVD -B: Performed by: NURSE ANESTHETIST, CERTIFIED REGISTERED

## 2020-11-05 PROCEDURE — 77030040361 HC SLV COMPR DVT MDII -B: Performed by: SURGERY

## 2020-11-05 PROCEDURE — 74011250637 HC RX REV CODE- 250/637: Performed by: SURGERY

## 2020-11-05 PROCEDURE — 77030019908 HC STETH ESOPH SIMS -A: Performed by: NURSE ANESTHETIST, CERTIFIED REGISTERED

## 2020-11-05 PROCEDURE — 74011250636 HC RX REV CODE- 250/636: Performed by: ANESTHESIOLOGY

## 2020-11-05 PROCEDURE — 74011000250 HC RX REV CODE- 250: Performed by: NURSE ANESTHETIST, CERTIFIED REGISTERED

## 2020-11-05 PROCEDURE — 2709999900 HC NON-CHARGEABLE SUPPLY: Performed by: SURGERY

## 2020-11-05 PROCEDURE — 74011000250 HC RX REV CODE- 250: Performed by: SURGERY

## 2020-11-05 PROCEDURE — 76060000032 HC ANESTHESIA 0.5 TO 1 HR: Performed by: SURGERY

## 2020-11-05 PROCEDURE — 76210000020 HC REC RM PH II FIRST 0.5 HR: Performed by: SURGERY

## 2020-11-05 PROCEDURE — 77030026438 HC STYL ET INTUB CARD -A: Performed by: NURSE ANESTHETIST, CERTIFIED REGISTERED

## 2020-11-05 PROCEDURE — 77030011264 HC ELECTRD BLD EXT COVD -A: Performed by: SURGERY

## 2020-11-05 PROCEDURE — 76210000006 HC OR PH I REC 0.5 TO 1 HR: Performed by: SURGERY

## 2020-11-05 PROCEDURE — 77030013079 HC BLNKT BAIR HGGR 3M -A: Performed by: NURSE ANESTHETIST, CERTIFIED REGISTERED

## 2020-11-05 RX ORDER — PROPOFOL 10 MG/ML
INJECTION, EMULSION INTRAVENOUS AS NEEDED
Status: DISCONTINUED | OUTPATIENT
Start: 2020-11-05 | End: 2020-11-05 | Stop reason: HOSPADM

## 2020-11-05 RX ORDER — SODIUM CHLORIDE, SODIUM LACTATE, POTASSIUM CHLORIDE, CALCIUM CHLORIDE 600; 310; 30; 20 MG/100ML; MG/100ML; MG/100ML; MG/100ML
25 INJECTION, SOLUTION INTRAVENOUS ONCE
Status: COMPLETED | OUTPATIENT
Start: 2020-11-05 | End: 2020-11-05

## 2020-11-05 RX ORDER — FENTANYL CITRATE 50 UG/ML
INJECTION, SOLUTION INTRAMUSCULAR; INTRAVENOUS AS NEEDED
Status: DISCONTINUED | OUTPATIENT
Start: 2020-11-05 | End: 2020-11-05 | Stop reason: HOSPADM

## 2020-11-05 RX ORDER — BUPIVACAINE HYDROCHLORIDE AND EPINEPHRINE 2.5; 5 MG/ML; UG/ML
INJECTION, SOLUTION EPIDURAL; INFILTRATION; INTRACAUDAL; PERINEURAL AS NEEDED
Status: DISCONTINUED | OUTPATIENT
Start: 2020-11-05 | End: 2020-11-05 | Stop reason: HOSPADM

## 2020-11-05 RX ORDER — FENTANYL CITRATE 50 UG/ML
50 INJECTION, SOLUTION INTRAMUSCULAR; INTRAVENOUS AS NEEDED
Status: DISCONTINUED | OUTPATIENT
Start: 2020-11-05 | End: 2020-11-05 | Stop reason: HOSPADM

## 2020-11-05 RX ORDER — SODIUM CHLORIDE, SODIUM LACTATE, POTASSIUM CHLORIDE, CALCIUM CHLORIDE 600; 310; 30; 20 MG/100ML; MG/100ML; MG/100ML; MG/100ML
25 INJECTION, SOLUTION INTRAVENOUS CONTINUOUS
Status: DISCONTINUED | OUTPATIENT
Start: 2020-11-05 | End: 2020-11-05 | Stop reason: HOSPADM

## 2020-11-05 RX ORDER — LIDOCAINE HYDROCHLORIDE 20 MG/ML
INJECTION, SOLUTION EPIDURAL; INFILTRATION; INTRACAUDAL; PERINEURAL AS NEEDED
Status: DISCONTINUED | OUTPATIENT
Start: 2020-11-05 | End: 2020-11-05 | Stop reason: HOSPADM

## 2020-11-05 RX ORDER — ONDANSETRON 2 MG/ML
4 INJECTION INTRAMUSCULAR; INTRAVENOUS AS NEEDED
Status: DISCONTINUED | OUTPATIENT
Start: 2020-11-05 | End: 2020-11-05 | Stop reason: HOSPADM

## 2020-11-05 RX ORDER — LIDOCAINE HYDROCHLORIDE 10 MG/ML
0.1 INJECTION, SOLUTION EPIDURAL; INFILTRATION; INTRACAUDAL; PERINEURAL AS NEEDED
Status: DISCONTINUED | OUTPATIENT
Start: 2020-11-05 | End: 2020-11-05 | Stop reason: HOSPADM

## 2020-11-05 RX ORDER — MIDAZOLAM HYDROCHLORIDE 1 MG/ML
INJECTION, SOLUTION INTRAMUSCULAR; INTRAVENOUS AS NEEDED
Status: DISCONTINUED | OUTPATIENT
Start: 2020-11-05 | End: 2020-11-05 | Stop reason: HOSPADM

## 2020-11-05 RX ORDER — MORPHINE SULFATE 10 MG/ML
2 INJECTION, SOLUTION INTRAMUSCULAR; INTRAVENOUS
Status: DISCONTINUED | OUTPATIENT
Start: 2020-11-05 | End: 2020-11-05 | Stop reason: HOSPADM

## 2020-11-05 RX ORDER — FENTANYL CITRATE 50 UG/ML
25 INJECTION, SOLUTION INTRAMUSCULAR; INTRAVENOUS
Status: DISCONTINUED | OUTPATIENT
Start: 2020-11-05 | End: 2020-11-05 | Stop reason: HOSPADM

## 2020-11-05 RX ORDER — MIDAZOLAM HYDROCHLORIDE 1 MG/ML
1 INJECTION, SOLUTION INTRAMUSCULAR; INTRAVENOUS AS NEEDED
Status: DISCONTINUED | OUTPATIENT
Start: 2020-11-05 | End: 2020-11-05 | Stop reason: HOSPADM

## 2020-11-05 RX ORDER — SUCCINYLCHOLINE CHLORIDE 20 MG/ML
INJECTION INTRAMUSCULAR; INTRAVENOUS AS NEEDED
Status: DISCONTINUED | OUTPATIENT
Start: 2020-11-05 | End: 2020-11-05 | Stop reason: HOSPADM

## 2020-11-05 RX ORDER — ROCURONIUM BROMIDE 10 MG/ML
INJECTION, SOLUTION INTRAVENOUS AS NEEDED
Status: DISCONTINUED | OUTPATIENT
Start: 2020-11-05 | End: 2020-11-05 | Stop reason: HOSPADM

## 2020-11-05 RX ORDER — HYDROMORPHONE HYDROCHLORIDE 2 MG/ML
INJECTION, SOLUTION INTRAMUSCULAR; INTRAVENOUS; SUBCUTANEOUS AS NEEDED
Status: DISCONTINUED | OUTPATIENT
Start: 2020-11-05 | End: 2020-11-05 | Stop reason: HOSPADM

## 2020-11-05 RX ADMIN — ROCURONIUM BROMIDE 10 MG: 10 INJECTION INTRAVENOUS at 12:12

## 2020-11-05 RX ADMIN — FENTANYL CITRATE 100 MCG: 50 INJECTION, SOLUTION INTRAMUSCULAR; INTRAVENOUS at 12:10

## 2020-11-05 RX ADMIN — SODIUM CHLORIDE, POTASSIUM CHLORIDE, SODIUM LACTATE AND CALCIUM CHLORIDE: 600; 310; 30; 20 INJECTION, SOLUTION INTRAVENOUS at 12:04

## 2020-11-05 RX ADMIN — Medication 3 AMPULE: at 10:14

## 2020-11-05 RX ADMIN — HYDROMORPHONE HYDROCHLORIDE 0.2 MG: 2 INJECTION, SOLUTION INTRAMUSCULAR; INTRAVENOUS; SUBCUTANEOUS at 12:34

## 2020-11-05 RX ADMIN — PROPOFOL 20 MG: 10 INJECTION, EMULSION INTRAVENOUS at 12:31

## 2020-11-05 RX ADMIN — PROPOFOL 150 MG: 10 INJECTION, EMULSION INTRAVENOUS at 12:12

## 2020-11-05 RX ADMIN — HYDROMORPHONE HYDROCHLORIDE 0.2 MG: 2 INJECTION, SOLUTION INTRAMUSCULAR; INTRAVENOUS; SUBCUTANEOUS at 12:38

## 2020-11-05 RX ADMIN — SUCCINYLCHOLINE CHLORIDE 140 MG: 20 INJECTION, SOLUTION INTRAMUSCULAR; INTRAVENOUS at 12:12

## 2020-11-05 RX ADMIN — LIDOCAINE HYDROCHLORIDE 100 MG: 20 INJECTION, SOLUTION INTRAVENOUS at 12:10

## 2020-11-05 RX ADMIN — MIDAZOLAM HYDROCHLORIDE 2 MG: 1 INJECTION, SOLUTION INTRAMUSCULAR; INTRAVENOUS at 12:04

## 2020-11-05 RX ADMIN — PROPOFOL 20 MG: 10 INJECTION, EMULSION INTRAVENOUS at 12:20

## 2020-11-05 RX ADMIN — SODIUM CHLORIDE, SODIUM LACTATE, POTASSIUM CHLORIDE, AND CALCIUM CHLORIDE 25 ML/HR: 600; 310; 30; 20 INJECTION, SOLUTION INTRAVENOUS at 10:14

## 2020-11-05 NOTE — BRIEF OP NOTE
Brief Postoperative Note    Patient: Ariadna Flores  YOB: 1970  MRN: [id number]    Date of Procedure: 11/5/2020     Pre-Op Diagnosis: RECTAL FISTULA    Post-Op Diagnosis: Same as preoperative diagnosis. Procedure(s):  EXAM UNDER ANESTHESIA    Surgeon(s):  Honey Soliz MD    Surgical Assistant: None    Anesthesia: General     Estimated Blood Loss (mL): Minimal    Complications: None    Specimens: * No specimens in log *     Implants: * No implants in log *    Drains: * No LDAs found *    Findings: Very deep intergluteal cleft with narrow anal canal precluding adequate visualization.     Electronically Signed by Ankur Meyer MD on 11/5/2020 at 12:55 PM

## 2020-11-05 NOTE — PERIOP NOTES
[address] from Operating Room to PACU    Report received from ELAINA Rueda and ANDRADE Stapleton CRNA regarding Doreen Young. Surgeon(s):  Naresh Pillai MD  And Procedure(s) (LRB):  EXAM UNDER ANESTHESIA (N/A)  confirmed   with allergies and dressings discussed. Anesthesia type, drugs, patient history, complications, estimated blood loss, vital signs, intake and output, and last pain medication, lines and temperature were reviewed. 26 Assisted patient with getting dressed. Patient denies pain. IV removed. Discharge brochure provided; Reviewed DC instructions with patient. Opportunity for questions and clarifications was provided; verbalized understanding. Follow-up appointments reviewed/written for patient. Pt stable and ambulatory for discharge; Patient's friend  to provide transportation to home. Clarified all personal belongings sent with patient. IV removed (without difficulty/pt tolerated well) Telemetry discontinued.

## 2020-11-05 NOTE — DISCHARGE INSTRUCTIONS
Andrew Mendoza MD, [address] [facility] Fawn Lobato MD, [address] [facility] Coretta Pitt MD, [facility]. MD Serena Siddiqui MD Denna Martyr, MD Elmon Myrtle, MD    [facility] Discharge Instructions for Ambulatory 23-Hour Surgery Patients    1. Advance to high fiber diet as tolerated. 2. Take Metamucil/Citrucel 1 teaspoon mixed in a glass of water in AM and PM.  3. Remove dressing at 8pm.  4. Take 1 sitz baths today (warm water baths for 15-20 minutes). Begin four (4) times a day the day after surgery. 5. Apply Nupercainal Ointment (does not need a prescription) to the anal area after sitz baths, place a dry 4x4 gauze over the are between the buttocks. 6. Take pain medication as prescribed. (NO DRIVING WHILE ON PAIN MEDICATION). 7. No lifting any objects weighing more than 15 pounds. 8. No sitting more than 45 minutes without standing, walking, or lying down. 9. You may walk as desired. 10.  Please schedule an appointment in the office within 4 weeks. Call ahead to schedule your appointment [phone]. 11.  Call [facility] [phone] if you have any problems or questions after hours. 12.  Expect slight bright red blood up to four (4) weeks from surgery.

## 2020-11-05 NOTE — ANESTHESIA PREPROCEDURE EVALUATION
Relevant Problems   No relevant active problems       Anesthetic History   No history of anesthetic complications            Review of Systems / Medical History  Patient summary reviewed, nursing notes reviewed and pertinent labs reviewed    Pulmonary            Asthma        Neuro/Psych   Within defined limits           Cardiovascular    Hypertension              Exercise tolerance: >4 METS     GI/Hepatic/Renal  Within defined limits              Endo/Other  Within defined limits           Other Findings   Comments: Neurofibromatosis           Physical Exam    Airway  Mallampati: III  TM Distance: 4 - 6 cm  Neck ROM: normal range of motion   Mouth opening: Normal     Cardiovascular  Regular rate and rhythm,  S1 and S2 normal,  no murmur, click, rub, or gallop             Dental  No notable dental hx       Pulmonary  Breath sounds clear to auscultation               Abdominal  GI exam deferred       Other Findings            Anesthetic Plan    ASA: 2  Anesthesia type: general          Induction: Intravenous  Anesthetic plan and risks discussed with: Patient

## 2020-11-05 NOTE — ANESTHESIA POSTPROCEDURE EVALUATION
Procedure(s):  EXAM UNDER ANESTHESIA.    general    Anesthesia Post Evaluation        Patient location during evaluation: PACU  Note status: Adequate. Level of consciousness: responsive to verbal stimuli and sleepy but conscious  Pain management: satisfactory to patient  Airway patency: patent  Anesthetic complications: no  Cardiovascular status: acceptable  Respiratory status: acceptable  Hydration status: acceptable  Comments: +Post-Anesthesia Evaluation and Assessment    Patient: Arianna Castelan MRN: [id number]  SSN: [id number]   YOB: 1970  Age: 48 y.o. Sex: male      Cardiovascular Function/Vital Signs    BP (!) 140/79   Pulse 74   Temp 36.6 °C (97.9 °F)   Resp 16   Ht 5' 3\" (1.6 m)   Wt 79.3 kg (174 lb 13.2 oz)   SpO2 100%   BMI 30.97 kg/m²     Patient is status post Procedure(s):  EXAM UNDER ANESTHESIA. Nausea/Vomiting: Controlled. Postoperative hydration reviewed and adequate. Pain:  Pain Scale 1: Numeric (0 - 10) (11/05/20 1330)  Pain Intensity 1: 0 (11/05/20 1330)   Managed. Neurological Status:   Neuro (WDL): Exceptions to WDL (11/05/20 1259)   At baseline. Mental Status and Level of Consciousness: Arousable. Pulmonary Status:   O2 Device: Room air (11/05/20 1330)   Adequate oxygenation and airway patent. Complications related to anesthesia: None    Post-anesthesia assessment completed. No concerns. Signed By: Carloz Fuentes MD    11/5/2020  Post anesthesia nausea and vomiting:  controlled      INITIAL Post-op Vital signs:   Vitals Value Taken Time   /79 11/5/2020  1:30 PM   Temp 36.6 °C (97.9 °F) 11/5/2020  1:03 PM   Pulse 75 11/5/2020  1:40 PM   Resp 23 11/5/2020  1:40 PM   SpO2 100 % 11/5/2020  1:40 PM   Vitals shown include unvalidated device data.

## 2020-11-05 NOTE — PERIOP NOTES
covid test done negative. Pt has sheltered in as best as possible. No s/s/ covid virus nor has he been around anyone with the covid virus that he knows of. Valuables locked up with securty receipt on chart. Pt will take out contacts before surgery      PT DID [address].

## 2020-11-06 NOTE — OP NOTES
[address]  OPERATIVE REPORT    Name:  Uriel Ferrera  MR#:  [id number]  :  1970  ACCOUNT #:  [de-identified]  DATE OF SERVICE:  2020    PREOPERATIVE DIAGNOSIS:  Rectal fistula. POSTOPERATIVE DIAGNOSIS:  Rectal fistula. PROCEDURE PERFORMED:  Exam under anesthesia. SURGEON:  Chika Cervantes MD    ASSISTANT:  None. ANESTHESIA:  General.    COMPLICATIONS:  None. SPECIMENS REMOVED:  None. IMPLANTS:  None. DRAINS:  None. ESTIMATED BLOOD LOSS:  Minimal.    FINDINGS:  Very deep intergluteal cleft with narrow anal opening precluding adequate visualization. There was an external opening, which easily tracked into the presacral space, but I was unable to find an internal opening. INDICATIONS:  This is a 51-year-old male who presents with perianal discharge. He was found to have a perirectal fistula. Given the distance from the anal opening, I decided to perform an MRI, which in fact showed a very complex fistula with branching bilaterally. It seemed that the 2 fistulae coalesced posteriorly. I decided to take him to the operating room for just a seton drain placement for potential LIFT procedure in the future. I explained the risks and benefits of the procedure including, but not limited to, bleeding, infection, damage to surrounding structures, and need for further procedures. He understood the risks and has elected to proceed. DESCRIPTION OF PROCEDURE:  The patient was brought to the operating suite and placed in the supine position. General endotracheal anesthesia was induced. He was then flipped over in the prone jackknife position. His buttocks were taped apart and his perianal area was prepped and draped in the usual sterile fashion. A time-out was performed indicating the correct patient and procedure to be performed as per hospital protocol.     I began by inspecting the anal canal.  Unfortunately, the patient had a very deep intergluteal cleft with a narrow anal outlet. This made it exceedingly difficult to get adequate visualization into the anal canal and distal rectum. I attempted using multiple retractors including Hill-Kaur and a [place] anoscope. Unfortunately, this made it quite difficult to get visualization. I then placed a fistula probe along the external opening in the posterior midline position. This was many centimeters away from the anal opening. The fistula probe actually went more posterior than anterior towards the anal outlet. I was able to identify a track that went to the right and left of the rectum; however, I was unable to get the fistula probe into the internal opening itself. I was not able to find any internal opening. The fistula probe was not long enough and was actually hubbed at the level of the skin. It seemed that the fistula tract was too long for me to actually identify any internal opening and given the fact that I had poor visualization in the rectum, I decided to abort the case. Given the fact that the patient did not present with any abscesses, I decided to hold off on any further intervention. Dry dressing was placed. The patient was awakened and taken to recovery room in stable condition.       MD THUAN Mccullough/[email or web address]  D:  11/05/2020 13:03  T:  11/05/2020 23:35  JOB #:  [id number]

## 2024-01-12 NOTE — ED NOTES
-- DO NOT REPLY / DO NOT REPLY ALL --  -- Message is from Engagement Center Operations (ECO) --    ONLY TO BE USED WITHIN A REFILL MEDICATION ENCOUNTER    Med Refill  Is the patient currently having any symptoms?: No/Non-Emergent symptoms    Name of medication requested: See pended med    Is this the first request for the medication in the last 48 hours?: Yes      Patient is requesting a medication refill - medication is on active list    Full name of the provider who ordered the medication: [facility] site name / Account # for provider: [place]     Preferred Pharmacy: Pharmacy  [id number] - [place], [place] - [address]    Patient confirmed the above pharmacy as correct?  Yes        Alternative phone number:     Can a detailed message be left?: Yes         Patient (s)  given copy of dc instructions and 0 paper script(s) and 1 electronic scripts. Patient (s)  verbalized understanding of instructions and script (s). Patient given a current medication reconciliation form and verbalized understanding of their medications. Patient (s) verbalized understanding of the importance of discussing medications with  his or her physician or clinic they will be following up with. Patient alert and oriented and in no acute distress. Patient offered wheelchair from treatment area to hospital entrance, patient declined wheelchair.

## (undated) DEVICE — KIT,1200CC CANISTER,3/16"X6' TUBING: Brand: MEDLINE INDUSTRIES, INC.

## (undated) DEVICE — SYR 10ML LUER LOK 1/5ML GRAD --

## (undated) DEVICE — DBD-PACK,LAPAROTOMY,2 REINFORCED GOWNS: Brand: MEDLINE

## (undated) DEVICE — STERILE POLYISOPRENE POWDER-FREE SURGICAL GLOVES: Brand: PROTEXIS

## (undated) DEVICE — REMEDY ESSENTIALS FOAM BODY CLN 4OZ

## (undated) DEVICE — SURGICAL PROCEDURE PACK BASIN MAJ SET CUST NO CAUT

## (undated) DEVICE — BLADE ELECTRODE: Brand: EDGE

## (undated) DEVICE — YANKAUER,POOLE TIP,STERILE,50/CS: Brand: MEDLINE

## (undated) DEVICE — INFECTION CONTROL KIT SYS

## (undated) DEVICE — SOLUTION SCRB 2OZ 10% POVIDONE IOD ANTIMIC BTL

## (undated) DEVICE — PAD,ABDOMINAL,8"X10",ST,LF: Brand: MEDLINE

## (undated) DEVICE — HANDLE LT SNAP ON ULT DURABLE LENS FOR TRUMPF ALC DISPOSABLE

## (undated) DEVICE — JELLY,LUBE,STERILE,FLIP TOP,TUBE,4-OZ: Brand: MEDLINE

## (undated) DEVICE — ROCKER SWITCH PENCIL BLADE ELECTRODE, HOLSTER: Brand: EDGE

## (undated) DEVICE — NEEDLE HYPO 25GA L1.5IN BVL ORIENTED ECLIPSE

## (undated) DEVICE — STERILE POLYISOPRENE POWDER-FREE SURGICAL GLOVES WITH EMOLLIENT COATING: Brand: PROTEXIS

## (undated) DEVICE — GARMENT,MEDLINE,DVT,INT,CALF,MED, GEN2: Brand: MEDLINE

## (undated) DEVICE — SPONGE GZ W4XL4IN COT 12 PLY TYP VII WVN C FLD DSGN

## (undated) DEVICE — MASTISOL ADHESIVE LIQ 2/3ML

## (undated) DEVICE — SURGIFOAM SPNG SZ 100

## (undated) DEVICE — STRAP,POSITIONING,KNEE/BODY,FOAM,4X60": Brand: MEDLINE